# Patient Record
Sex: MALE | Race: WHITE | NOT HISPANIC OR LATINO | Employment: OTHER | URBAN - METROPOLITAN AREA
[De-identification: names, ages, dates, MRNs, and addresses within clinical notes are randomized per-mention and may not be internally consistent; named-entity substitution may affect disease eponyms.]

---

## 2018-09-10 ENCOUNTER — HOSPITAL ENCOUNTER (EMERGENCY)
Facility: MEDICAL CENTER | Age: 73
End: 2018-09-10
Attending: EMERGENCY MEDICINE
Payer: COMMERCIAL

## 2018-09-10 ENCOUNTER — APPOINTMENT (OUTPATIENT)
Dept: RADIOLOGY | Facility: MEDICAL CENTER | Age: 73
End: 2018-09-10
Attending: EMERGENCY MEDICINE
Payer: COMMERCIAL

## 2018-09-10 VITALS
RESPIRATION RATE: 17 BRPM | OXYGEN SATURATION: 99 % | DIASTOLIC BLOOD PRESSURE: 92 MMHG | WEIGHT: 158.73 LBS | TEMPERATURE: 98 F | HEART RATE: 105 BPM | SYSTOLIC BLOOD PRESSURE: 150 MMHG

## 2018-09-10 DIAGNOSIS — R47.01 APHASIA: ICD-10-CM

## 2018-09-10 LAB
ALBUMIN SERPL BCP-MCNC: 4.1 G/DL (ref 3.2–4.9)
ALBUMIN/GLOB SERPL: 1.5 G/DL
ALP SERPL-CCNC: 72 U/L (ref 30–99)
ALT SERPL-CCNC: 17 U/L (ref 2–50)
ANION GAP SERPL CALC-SCNC: 14 MMOL/L (ref 0–11.9)
APPEARANCE UR: CLEAR
APTT PPP: 24.7 SEC (ref 24.7–36)
AST SERPL-CCNC: 26 U/L (ref 12–45)
BASOPHILS # BLD AUTO: 1 % (ref 0–1.8)
BASOPHILS # BLD: 0.05 K/UL (ref 0–0.12)
BILIRUB SERPL-MCNC: 0.3 MG/DL (ref 0.1–1.5)
BILIRUB UR QL STRIP.AUTO: NEGATIVE
BUN SERPL-MCNC: 16 MG/DL (ref 8–22)
CALCIUM SERPL-MCNC: 8.6 MG/DL (ref 8.5–10.5)
CHLORIDE SERPL-SCNC: 105 MMOL/L (ref 96–112)
CO2 SERPL-SCNC: 21 MMOL/L (ref 20–33)
COLOR UR: YELLOW
CREAT SERPL-MCNC: 1.05 MG/DL (ref 0.5–1.4)
EKG IMPRESSION: NORMAL
EOSINOPHIL # BLD AUTO: 0.09 K/UL (ref 0–0.51)
EOSINOPHIL NFR BLD: 1.8 % (ref 0–6.9)
ERYTHROCYTE [DISTWIDTH] IN BLOOD BY AUTOMATED COUNT: 46.1 FL (ref 35.9–50)
GLOBULIN SER CALC-MCNC: 2.7 G/DL (ref 1.9–3.5)
GLUCOSE SERPL-MCNC: 185 MG/DL (ref 65–99)
GLUCOSE UR STRIP.AUTO-MCNC: NEGATIVE MG/DL
HCT VFR BLD AUTO: 45.4 % (ref 42–52)
HGB BLD-MCNC: 14.3 G/DL (ref 14–18)
IMM GRANULOCYTES # BLD AUTO: 0.01 K/UL (ref 0–0.11)
IMM GRANULOCYTES NFR BLD AUTO: 0.2 % (ref 0–0.9)
INR PPP: 0.96 (ref 0.87–1.13)
KETONES UR STRIP.AUTO-MCNC: NEGATIVE MG/DL
LEUKOCYTE ESTERASE UR QL STRIP.AUTO: NEGATIVE
LYMPHOCYTES # BLD AUTO: 1.94 K/UL (ref 1–4.8)
LYMPHOCYTES NFR BLD: 37.8 % (ref 22–41)
MCH RBC QN AUTO: 29.6 PG (ref 27–33)
MCHC RBC AUTO-ENTMCNC: 31.5 G/DL (ref 33.7–35.3)
MCV RBC AUTO: 94 FL (ref 81.4–97.8)
MICRO URNS: NORMAL
MONOCYTES # BLD AUTO: 0.46 K/UL (ref 0–0.85)
MONOCYTES NFR BLD AUTO: 9 % (ref 0–13.4)
NEUTROPHILS # BLD AUTO: 2.58 K/UL (ref 1.82–7.42)
NEUTROPHILS NFR BLD: 50.2 % (ref 44–72)
NITRITE UR QL STRIP.AUTO: NEGATIVE
NRBC # BLD AUTO: 0 K/UL
NRBC BLD-RTO: 0 /100 WBC
PH UR STRIP.AUTO: 5 [PH]
PLATELET # BLD AUTO: 204 K/UL (ref 164–446)
PMV BLD AUTO: 10.5 FL (ref 9–12.9)
POTASSIUM SERPL-SCNC: 3.6 MMOL/L (ref 3.6–5.5)
PROT SERPL-MCNC: 6.8 G/DL (ref 6–8.2)
PROT UR QL STRIP: NEGATIVE MG/DL
PROTHROMBIN TIME: 12.5 SEC (ref 12–14.6)
RBC # BLD AUTO: 4.83 M/UL (ref 4.7–6.1)
RBC UR QL AUTO: NEGATIVE
SODIUM SERPL-SCNC: 140 MMOL/L (ref 135–145)
SP GR UR STRIP.AUTO: 1.01
UROBILINOGEN UR STRIP.AUTO-MCNC: 0.2 MG/DL
WBC # BLD AUTO: 5.1 K/UL (ref 4.8–10.8)

## 2018-09-10 PROCEDURE — 99284 EMERGENCY DEPT VISIT MOD MDM: CPT

## 2018-09-10 PROCEDURE — 93005 ELECTROCARDIOGRAM TRACING: CPT | Performed by: EMERGENCY MEDICINE

## 2018-09-10 PROCEDURE — 85610 PROTHROMBIN TIME: CPT

## 2018-09-10 PROCEDURE — 70450 CT HEAD/BRAIN W/O DYE: CPT

## 2018-09-10 PROCEDURE — 85025 COMPLETE CBC W/AUTO DIFF WBC: CPT

## 2018-09-10 PROCEDURE — 80053 COMPREHEN METABOLIC PANEL: CPT

## 2018-09-10 PROCEDURE — 81003 URINALYSIS AUTO W/O SCOPE: CPT

## 2018-09-10 PROCEDURE — 71045 X-RAY EXAM CHEST 1 VIEW: CPT

## 2018-09-10 PROCEDURE — 85730 THROMBOPLASTIN TIME PARTIAL: CPT

## 2018-09-10 RX ORDER — ASPIRIN 325 MG
325 TABLET ORAL ONCE
Status: DISCONTINUED | OUTPATIENT
Start: 2018-09-10 | End: 2018-09-10 | Stop reason: HOSPADM

## 2018-09-10 ASSESSMENT — PAIN SCALES - GENERAL: PAINLEVEL_OUTOF10: 0

## 2018-09-10 NOTE — ED TRIAGE NOTES
Esteban Amezcua  73 y.o. male  Chief Complaint   Patient presents with   • ALOC     Acute episode of expressive aphasia at 2349 tongiht, A/Ox1 on scene, no other neuro deficits.  Pt now A/O to baseline.  Denies hx of CVA       Bib ems for above.  [facility] em[facility].  [facility] staff called ems.  Speech pattern was inappropriate and patient couldn't answer questions appropriately.  Pt improved to A/Ox3 in the ambulance, and now speaking full sentences.  Pt has no memory of this event, states that this has never happened before.  FSBS on scene was 141, all other vitals stable.  Denies drinking alcohol

## 2018-09-10 NOTE — ED PROVIDER NOTES
ED Provider Note    CHIEF COMPLAINT  Chief Complaint   Patient presents with   • ALOC     Acute episode of expressive aphasia at 2349 tongiht, A/Ox1 on scene, no other neuro deficits.  Pt now A/O to baseline.  Denies hx of CVA       HPI  Esteban Amezcua is a 73 y.o. male who presents after the patient had a spell of aphasia that started approximately 2349 this evening.  The [place] when he had a spell where he is having difficulty with speech and was speaking incomprehensibly.  The patient does not remember the event.  This was all per EMS.  The patient states he does not have a headache.  He has not had any associated palpitations, chest pain, no difficulty with breathing.  He has not noticed any weakness throughout his extremities nor paresthesias.  The patient is back to his baseline at this time with no current medical complaints.  He denies alcohol and drug use.    REVIEW OF SYSTEMS  See HPI for further details. All other systems are negative.     PAST MEDICAL HISTORY  History reviewed. No pertinent past medical history.    SOCIAL HISTORY  Social History     Social History   • Marital status: N/A     Spouse name: N/A   • Number of children: N/A   • Years of education: N/A     Social History Main Topics   • Smoking status: Former Smoker   • Smokeless tobacco: Never Used   • Alcohol use No   • Drug use: No   • Sexual activity: Not on file     Other Topics Concern   • Not on file     Social History Narrative   • No narrative on file           PHYSICAL EXAM  VITAL SIGNS: /92   Pulse (!) 110   Temp 36.7 °C (98 °F)   Resp 18   Wt 72 kg (158 lb 11.7 oz)   SpO2 98%   Constitutional: Well developed, Well nourished, No acute distress, Non-toxic appearance.   HENT: Normocephalic, Atraumatic, tympanic membranes are intact and nonerythematous bilaterally, Oropharynx moist without exudates or erythema, Nose normal.   Eyes: PERRLA, EOMI, Conjunctiva normal.  Neck: Supple without  meningismus  Lymphatic: No lymphadenopathy noted.   Cardiovascular: Normal heart rate, Normal rhythm, No murmurs, No rubs, No gallops.   Thorax & Lungs: Normal breath sounds, No respiratory distress, No wheezing, No chest tenderness.   Abdomen: Bowel sounds normal, Soft, No tenderness, no rebound, no guarding, no distention, No masses, No pulsatile masses.   Skin: Warm, Dry, No erythema, No rash.   Back: No tenderness, No CVA tenderness.   Extremities: Atraumatic with symmetric distal pulses, No edema, No tenderness, No cyanosis, No clubbing.   Neurologic: Alert & oriented x 3, cranial nerves II through XII are intact, Normal motor function, Normal sensory function, No focal deficits noted.   Psychiatric: Affect normal, Judgment normal, Mood normal.     EKG  Twelve-lead EKG interpreted by myself shows a sinus tachycardia with a ventricular rate of 105, QRS is left axis deviation, no ST segment elevation the meets criteria, he does have slight elevation in V2 but it does not meet criteria as it is approximately one half a box and amplitude, normal T waves.    RADIOLOGY/PROCEDURES  CT-HEAD W/O   Final Result         1.  No acute intracranial abnormality is identified, there are nonspecific white matter changes, commonly associated with small vessel ischemic disease.  Associated mild cerebral atrophy is noted.   2.  Atherosclerosis.      DX-CHEST-PORTABLE (1 VIEW)   Final Result         1.  No acute cardiopulmonary disease.            COURSE & MEDICAL DECISION MAKING  Pertinent Labs & Imaging studies reviewed. (See chart for details)  This a 73-year-old gentleman who presents the emergency department after a spell of expressive aphasia as well as confusion.  This is very concerning for my TIA standpoint.  CT scan of the head does show small vessel disease but otherwise no acute changes.  The blood work does not show any evidence of a metabolic source and clinically do not appreciate any evidence of an infection.  The  patient has been neurologically intact throughout his stay in the emergency department.  I spoke with the patient regarding admission he is adamant he wants to leave AGAINST MEDICAL ADVICE.  He understands the risk of this choice.  The patient is alert and appropriate and able to make this decision.  I spoke with friends regarding the patient's decision and he is aware that he can change his mind at any time and return for further workup.  Otherwise he will go to the [facility] for further outpatient management as soon as possible.  FINAL IMPRESSION  1.  Expressive aphasia with altered mental status  2.  Suspect TIA     Disposition  The patient will be discharged AGAINST MEDICAL ADVICE    Electronically signed by: Kulwant Horn, 9/10/2018 12:39 AM

## 2019-02-11 ENCOUNTER — HOSPITAL ENCOUNTER (EMERGENCY)
Dept: HOSPITAL 8 - ED | Age: 74
Discharge: HOME | End: 2019-02-11
Payer: OTHER GOVERNMENT

## 2019-02-11 VITALS — SYSTOLIC BLOOD PRESSURE: 143 MMHG | DIASTOLIC BLOOD PRESSURE: 87 MMHG

## 2019-02-11 VITALS — WEIGHT: 170.35 LBS | BODY MASS INDEX: 23.85 KG/M2 | HEIGHT: 71 IN

## 2019-02-11 DIAGNOSIS — R55: Primary | ICD-10-CM

## 2019-02-11 DIAGNOSIS — R45.851: ICD-10-CM

## 2019-02-11 DIAGNOSIS — Z85.028: ICD-10-CM

## 2019-02-11 LAB
ALBUMIN SERPL-MCNC: 4 G/DL (ref 3.4–5)
ALP SERPL-CCNC: 73 U/L (ref 45–117)
ALT SERPL-CCNC: 24 U/L (ref 12–78)
ANION GAP SERPL CALC-SCNC: 9 MMOL/L (ref 5–15)
BASOPHILS # BLD AUTO: 0.02 X10^3/UL (ref 0–0.1)
BASOPHILS NFR BLD AUTO: 0 % (ref 0–1)
BILIRUB SERPL-MCNC: 0.5 MG/DL (ref 0.2–1)
CALCIUM SERPL-MCNC: 8.4 MG/DL (ref 8.5–10.1)
CHLORIDE SERPL-SCNC: 105 MMOL/L (ref 98–107)
CREAT SERPL-MCNC: 1.28 MG/DL (ref 0.7–1.3)
CULTURE INDICATED?: NO
EOSINOPHIL # BLD AUTO: 0.04 X10^3/UL (ref 0–0.4)
EOSINOPHIL NFR BLD AUTO: 1 % (ref 1–7)
ERYTHROCYTE [DISTWIDTH] IN BLOOD BY AUTOMATED COUNT: 14.4 % (ref 9.4–14.8)
LYMPHOCYTES # BLD AUTO: 0.8 X10^3/UL (ref 1–3.4)
LYMPHOCYTES NFR BLD AUTO: 11 % (ref 22–44)
MCH RBC QN AUTO: 30.6 PG (ref 27.5–34.5)
MCHC RBC AUTO-ENTMCNC: 33.9 G/DL (ref 33.2–36.2)
MCV RBC AUTO: 90.5 FL (ref 81–97)
MD: NO
MICROSCOPIC: (no result)
MONOCYTES # BLD AUTO: 0.43 X10^3/UL (ref 0.2–0.8)
MONOCYTES NFR BLD AUTO: 6 % (ref 2–9)
NEUTROPHILS # BLD AUTO: 6.24 X10^3/UL (ref 1.8–6.8)
NEUTROPHILS NFR BLD AUTO: 83 % (ref 42–75)
PLATELET # BLD AUTO: 231 X10^3/UL (ref 130–400)
PMV BLD AUTO: 8.1 FL (ref 7.4–10.4)
PROT SERPL-MCNC: 7.3 G/DL (ref 6.4–8.2)
RBC # BLD AUTO: 4.79 X10^6/UL (ref 4.38–5.82)
TROPONIN I SERPL-MCNC: < 0.015 NG/ML (ref 0–0.04)

## 2019-02-11 PROCEDURE — 93005 ELECTROCARDIOGRAM TRACING: CPT

## 2019-02-11 PROCEDURE — 99284 EMERGENCY DEPT VISIT MOD MDM: CPT

## 2019-02-11 PROCEDURE — 84484 ASSAY OF TROPONIN QUANT: CPT

## 2019-02-11 PROCEDURE — 82140 ASSAY OF AMMONIA: CPT

## 2019-02-11 PROCEDURE — 81003 URINALYSIS AUTO W/O SCOPE: CPT

## 2019-02-11 PROCEDURE — 70450 CT HEAD/BRAIN W/O DYE: CPT

## 2019-02-11 PROCEDURE — 80053 COMPREHEN METABOLIC PANEL: CPT

## 2019-02-11 PROCEDURE — 85025 COMPLETE CBC W/AUTO DIFF WBC: CPT

## 2019-02-11 PROCEDURE — 71045 X-RAY EXAM CHEST 1 VIEW: CPT

## 2019-02-11 PROCEDURE — 36415 COLL VENOUS BLD VENIPUNCTURE: CPT

## 2019-02-11 PROCEDURE — 80307 DRUG TEST PRSMV CHEM ANLYZR: CPT

## 2019-02-11 NOTE — NUR
BIB EMS FROM [facility], PT WAS SITTING ON STOOL AT THE SLOT MACHINE, +SYNCOPAL 
FALLING BACKWORDS, NO APPARENT INJURY NOTED.  [facility] SECURITY DESCRIBED SEIZURE 
LIKE ACTIVITY TO EMS.  FS = 133, VSS.  PT INITIALY ALTERED, DISORGANIZED 
CONVERSATION.  NOW PRESENTS A & 0 X 4.  PT ON MONITOR, VSS.  EKG COMPLETED.  
CALL LIGHT W/I REACH

## 2019-02-11 NOTE — NUR
[facility], CONCERNS EXPRESSED TO 
NP AND CHARGE. EXPLAINED TO PT THAT WE WOULD LIKE TO MAKE SURE HES STABLE 
BEFORE LETTING HIM GO TO A DIFFERENT FACILITY. POOR HISTORIAN. DOESNT KNOW WHAT 
MEDS TAKING FOR SEIZURES. TOLD THIS RN THE MEDS ARE IN HIS CAR BUT TOLD NP THAT 
HE ISNT TAKING MEDS. WILL CONTINUE TO MONITOR.

## 2019-02-11 NOTE — NUR
REPORT RECEIVED FROM ROSEMARY AUSTIN, ASSUMING CARE OF PT. DASHAWN NP AT BEDSIDE FOR 
ASSESSMENT. AWAITING ORDERS AT THIS TIME

## 2019-09-30 ENCOUNTER — HOSPITAL ENCOUNTER (EMERGENCY)
Facility: MEDICAL CENTER | Age: 74
End: 2019-09-30
Attending: EMERGENCY MEDICINE
Payer: COMMERCIAL

## 2019-09-30 VITALS
HEART RATE: 109 BPM | RESPIRATION RATE: 18 BRPM | TEMPERATURE: 99.5 F | OXYGEN SATURATION: 95 % | SYSTOLIC BLOOD PRESSURE: 141 MMHG | WEIGHT: 170 LBS | HEIGHT: 71 IN | BODY MASS INDEX: 23.8 KG/M2 | DIASTOLIC BLOOD PRESSURE: 102 MMHG

## 2019-09-30 DIAGNOSIS — R40.4 UNRESPONSIVE EPISODE: ICD-10-CM

## 2019-09-30 PROCEDURE — 99283 EMERGENCY DEPT VISIT LOW MDM: CPT

## 2019-09-30 NOTE — ED NOTES
Pt refusing all treatment and would like to go. proivder explained to pt all risks of leaving without treatment. Pt signed AMA form. Pt left ambulatory with steady gait.

## 2019-09-30 NOTE — ED PROVIDER NOTES
ED Provider Note    CHIEF COMPLAINT  Chief Complaint   Patient presents with   • ALOC        HPI    Primary care provider: No primary care provider on file.   History obtained from: Patient and EMS  History limited by: None     Esteban Amezcua is a 74 y.o. male who presents to the ED by EMS after he was found on the ground unresponsive at [facility] by [employer] security shortly prior to arrival.  Per EMS, AED was applied and no shock was advised.  Chest compression was started and patient quickly became responsive but was only oriented to self.  By ED arrival, patient is oriented x3.  Patient states that he remembers going to the [employer] and was watching TV and the next thing he remembered he was in the ambulance.  However, he currently reports feeling fine and denies any symptoms including pain/shortness of breath or difficulty breathing/nausea/vomiting/weakness or sensory change.  He denies any recent fever/illness or cough.  He is refusing any testing or treatment.  Patient states that this has happened in the past but he does not want anything done and is requesting discharge.  He is also upset that [facility].  Patient denies any alcohol use.    REVIEW OF SYSTEMS  Please see HPI for pertinent positives/negatives.  All other systems reviewed and are negative.     PAST MEDICAL HISTORY  No past medical history on file.     SURGICAL HISTORY  Past Surgical History:   Procedure Laterality Date   • SKULL FRACTURE DEPRESSED ELEVATION          SOCIAL HISTORY  Social History     Tobacco Use   • Smoking status: Former Smoker   • Smokeless tobacco: Never Used   Substance and Sexual Activity   • Alcohol use: No   • Drug use: No   • Sexual activity: Not on file        FAMILY HISTORY  No family history on file.     CURRENT MEDICATIONS  Home Medications    **Home medications have not yet been reviewed for this encounter**          ALLERGIES  No Known Allergies     PHYSICAL EXAM  VITAL SIGNS: /102    "Pulse (!) 109   Temp 37.5 °C (99.5 °F) (Temporal)   Resp 18   Ht 1.803 m (5' 11\")   Wt 77.1 kg (170 lb)   SpO2 95%   BMI 23.71 kg/m²  @SAVANNA[[id number]::@     Pulse ox interpretation: 95% I interpret this pulse ox as normal     Cardiac monitor interpretation: Sinus rhythm with heart rate in the 100 as interpreted by me.  The patient presented with unresponsiveness and cardiac monitor was ordered to monitor for dysrhythmia.    Constitutional: Well developed, well nourished, alert in no apparent distress, nontoxic appearance    HENT: No external signs of trauma, normocephalic, oropharynx moist and clear, nose normal    Eyes: PERRL, EOMI, vision and visual fields are grossly intact bilaterally, conjunctiva without erythema, no discharge, no icterus    Neck: Soft and supple, trachea midline, no stridor, no tenderness, no LAD, no JVD, good ROM    Cardiovascular: Tachycardia, no murmurs/rubs/gallops, strong distal pulses and good perfusion    Thorax & Lungs: No respiratory distress, CTAB   Abdomen: Soft, nontender, nondistended, no guarding, no rebound, normal BS    Back: No CVAT    Extremities: No cyanosis, no edema, no gross deformity, good ROM, no tenderness, intact distal pulses with brisk cap refill    Skin: Warm, dry, no pallor/cyanosis, no rash noted    Lymphatic: No lymphadenopathy noted    Neuro: Alert and oriented to person, place, and time.  GCS 15.  CN II-XII grossly intact.  Normal speech.  Equal strength bilateral UE/LE.  Sensation intact to touch.  No cerebellar signs.  Psychiatric: Cooperative, slightly upset, normal judgement, no signs of active hallucinations or delusions      DIAGNOSTIC STUDIES / PROCEDURES        LABS  All labs reviewed by me.     Results for orders placed or performed during the hospital encounter of 09/10/18   CBC WITH DIFFERENTIAL   Result Value Ref Range    WBC 5.1 4.8 - 10.8 K/uL    RBC 4.83 4.70 - 6.10 M/uL    Hemoglobin 14.3 14.0 - 18.0 g/dL    Hematocrit 45.4 42.0 - 52.0 %    " MCV 94.0 81.4 - 97.8 fL    MCH 29.6 27.0 - 33.0 pg    MCHC 31.5 (L) 33.7 - 35.3 g/dL    RDW 46.1 35.9 - 50.0 fL    Platelet Count 204 164 - 446 K/uL    MPV 10.5 9.0 - 12.9 fL    Neutrophils-Polys 50.20 44.00 - 72.00 %    Lymphocytes 37.80 22.00 - 41.00 %    Monocytes 9.00 0.00 - 13.40 %    Eosinophils 1.80 0.00 - 6.90 %    Basophils 1.00 0.00 - 1.80 %    Immature Granulocytes 0.20 0.00 - 0.90 %    Nucleated RBC 0.00 /100 WBC    Neutrophils (Absolute) 2.58 1.82 - 7.42 K/uL    Lymphs (Absolute) 1.94 1.00 - 4.80 K/uL    Monos (Absolute) 0.46 0.00 - 0.85 K/uL    Eos (Absolute) 0.09 0.00 - 0.51 K/uL    Baso (Absolute) 0.05 0.00 - 0.12 K/uL    Immature Granulocytes (abs) 0.01 0.00 - 0.11 K/uL    NRBC (Absolute) 0.00 K/uL   CMP   Result Value Ref Range    Sodium 140 135 - 145 mmol/L    Potassium 3.6 3.6 - 5.5 mmol/L    Chloride 105 96 - 112 mmol/L    Co2 21 20 - 33 mmol/L    Anion Gap 14.0 (H) 0.0 - 11.9    Glucose 185 (H) 65 - 99 mg/dL    Bun 16 8 - 22 mg/dL    Creatinine 1.05 0.50 - 1.40 mg/dL    Calcium 8.6 8.5 - 10.5 mg/dL    AST(SGOT) 26 12 - 45 U/L    ALT(SGPT) 17 2 - 50 U/L    Alkaline Phosphatase 72 30 - 99 U/L    Total Bilirubin 0.3 0.1 - 1.5 mg/dL    Albumin 4.1 3.2 - 4.9 g/dL    Total Protein 6.8 6.0 - 8.2 g/dL    Globulin 2.7 1.9 - 3.5 g/dL    A-G Ratio 1.5 g/dL   PROTHROMBIN TIME   Result Value Ref Range    PT 12.5 12.0 - 14.6 sec    INR 0.96 0.87 - 1.13   APTT   Result Value Ref Range    APTT 24.7 24.7 - 36.0 sec   URINALYSIS CULTURE, IF INDICATED   Result Value Ref Range    Color Yellow     Character Clear     Specific Gravity 1.013 <1.035    Ph 5.0 5.0 - 8.0    Glucose Negative Negative mg/dL    Ketones Negative Negative mg/dL    Protein Negative Negative mg/dL    Bilirubin Negative Negative    Urobilinogen, Urine 0.2 Negative    Nitrite Negative Negative    Leukocyte Esterase Negative Negative    Occult Blood Negative Negative    Micro Urine Req see below    ESTIMATED GFR   Result Value Ref Range    GFR If  African American >60 >60 mL/min/1.73 m 2    GFR If Non African American >60 >60 mL/min/1.73 m 2   EKG (NOW)   Result Value Ref Range    Report       [facility] Emergency Dept.    Test Date:  2018-09-10  Pt Name:    NITIN BRITT                Department: ER  MRN:        [id number]                      Room:        14  Gender:     Male                         Technician: [id number]  :        1945                   Requested By:GIANNA LAI  Order #:    [id number]                    Reading MD:    Measurements  Intervals                                Axis  Rate:       105                          P:          17  IN:         172                          QRS:        -31  QRSD:       92                           T:          40  QT:         344  QTc:        455    Interpretive Statements  SINUS TACHYCARDIA  LEFT AXIS DEVIATION  RSR' IN V1 OR V2, PROBABLY NORMAL VARIANT  CONSIDER ANTERIOR INFARCT  No previous ECG available for comparison          RADIOLOGY  The radiologist's interpretation of all radiological studies have been reviewed by me.     No orders to display          COURSE & MEDICAL DECISION MAKING  Nursing notes, VS, PMSFHx reviewed in chart.     Review of past medical records shows the patient was last seen in this ED September 10, 2018 for altered level of consciousness and patient signed out AMA.      Differential diagnoses considered include but are not limited to: CVA/TIA/intracranial hemorrhage, syncope, Sz, OD/intoxication, hypoglycemia, hypoxia, hypercarbia, electrolyte abnormality      History and physical exam as above.  By ED arrival, patient was alert and oriented and refused any testing or treatment.  He is not clinically intoxicated and denies ever using alcohol and is exhibiting capacity to make his own medical decision and understands the risks of signing out AMA.  He was encouraged to return to the ED if any concerns or if he changes his mind and he was [facility]  Patient verbalized understanding with no further questions or concerns.       The patient is leaving against medical advice.  I discussed with the patient the risks of leaving without receiving appropriate care to include permanent disability or death.  I have discussed possible alternatives.  The patient is not intoxicated clinically and has the capacity to understand the risks of his decision.  While I disagree with the patient's decision, I respect the patient's autonomy and will not keep him here against his will.  The patient was given discharge instructions and a followup plan as documented in the medical record.  I have advised the patient that he can return to the ED at any time.        The patient is referred to a primary physician for blood pressure management, diabetic screening, and for all other preventative health concerns.       FINAL IMPRESSION  1. Unresponsive episode Temporary          DISPOSITION  Patient signed out AMA      FOLLOW UP  Please [facility]    Call [facility], Emergency Dept  [address] [id number] [phone]    If symptoms worsen         OUTPATIENT MEDICATIONS  There are no discharge medications for this patient.         Electronically signed by: Андрей Hidalgo, 9/30/2019 12:47 AM      Portions of this record were made with voice recognition software.  Despite my review, spelling/grammar/context errors may still remain.  Interpretation of this chart should be taken in this context.

## 2019-09-30 NOTE — ED TRIAGE NOTES
Pt found down by security at [facility], AED was placed, no shock advised, compressions were started. Shortly into compressions pt came to. EMS arrived pt was alert to self. On arrival to ER pt is alert, oriented x3. neuros intact. BS by . 20G PIV in place by EMS. Pt placed on monitor. Awaiting provider eval.

## 2021-01-15 DIAGNOSIS — Z23 NEED FOR VACCINATION: ICD-10-CM

## 2021-06-12 ENCOUNTER — HOSPITAL ENCOUNTER (EMERGENCY)
Facility: MEDICAL CENTER | Age: 76
End: 2021-06-12
Attending: EMERGENCY MEDICINE
Payer: COMMERCIAL

## 2021-06-12 VITALS
HEIGHT: 69 IN | BODY MASS INDEX: 25.18 KG/M2 | HEART RATE: 100 BPM | DIASTOLIC BLOOD PRESSURE: 83 MMHG | WEIGHT: 170 LBS | TEMPERATURE: 98.1 F | SYSTOLIC BLOOD PRESSURE: 142 MMHG | OXYGEN SATURATION: 95 %

## 2021-06-12 DIAGNOSIS — R56.9 SEIZURE (HCC): ICD-10-CM

## 2021-06-12 LAB
ALBUMIN SERPL BCP-MCNC: 3.6 G/DL (ref 3.2–4.9)
ALBUMIN/GLOB SERPL: 1.5 G/DL
ALP SERPL-CCNC: 60 U/L (ref 30–99)
ALT SERPL-CCNC: 20 U/L (ref 2–50)
ANION GAP SERPL CALC-SCNC: 17 MMOL/L (ref 7–16)
AST SERPL-CCNC: 28 U/L (ref 12–45)
BASOPHILS # BLD AUTO: 0.7 % (ref 0–1.8)
BASOPHILS # BLD: 0.03 K/UL (ref 0–0.12)
BILIRUB SERPL-MCNC: 0.3 MG/DL (ref 0.1–1.5)
BUN SERPL-MCNC: 14 MG/DL (ref 8–22)
CALCIUM SERPL-MCNC: 8.1 MG/DL (ref 8.5–10.5)
CHLORIDE SERPL-SCNC: 106 MMOL/L (ref 96–112)
CO2 SERPL-SCNC: 19 MMOL/L (ref 20–33)
CREAT SERPL-MCNC: 1.08 MG/DL (ref 0.5–1.4)
EOSINOPHIL # BLD AUTO: 0.01 K/UL (ref 0–0.51)
EOSINOPHIL NFR BLD: 0.2 % (ref 0–6.9)
ERYTHROCYTE [DISTWIDTH] IN BLOOD BY AUTOMATED COUNT: 50.3 FL (ref 35.9–50)
GLOBULIN SER CALC-MCNC: 2.4 G/DL (ref 1.9–3.5)
GLUCOSE SERPL-MCNC: 241 MG/DL (ref 65–99)
HCT VFR BLD AUTO: 38.8 % (ref 42–52)
HGB BLD-MCNC: 12.7 G/DL (ref 14–18)
IMM GRANULOCYTES # BLD AUTO: 0.02 K/UL (ref 0–0.11)
IMM GRANULOCYTES NFR BLD AUTO: 0.4 % (ref 0–0.9)
LYMPHOCYTES # BLD AUTO: 1.21 K/UL (ref 1–4.8)
LYMPHOCYTES NFR BLD: 26.8 % (ref 22–41)
MCH RBC QN AUTO: 30.9 PG (ref 27–33)
MCHC RBC AUTO-ENTMCNC: 32.7 G/DL (ref 33.7–35.3)
MCV RBC AUTO: 94.4 FL (ref 81.4–97.8)
MONOCYTES # BLD AUTO: 0.35 K/UL (ref 0–0.85)
MONOCYTES NFR BLD AUTO: 7.7 % (ref 0–13.4)
NEUTROPHILS # BLD AUTO: 2.9 K/UL (ref 1.82–7.42)
NEUTROPHILS NFR BLD: 64.2 % (ref 44–72)
NRBC # BLD AUTO: 0 K/UL
NRBC BLD-RTO: 0 /100 WBC
PLATELET # BLD AUTO: 177 K/UL (ref 164–446)
PMV BLD AUTO: 10.8 FL (ref 9–12.9)
POTASSIUM SERPL-SCNC: 3.5 MMOL/L (ref 3.6–5.5)
PROT SERPL-MCNC: 6 G/DL (ref 6–8.2)
RBC # BLD AUTO: 4.11 M/UL (ref 4.7–6.1)
SODIUM SERPL-SCNC: 142 MMOL/L (ref 135–145)
WBC # BLD AUTO: 4.5 K/UL (ref 4.8–10.8)

## 2021-06-12 PROCEDURE — 99283 EMERGENCY DEPT VISIT LOW MDM: CPT

## 2021-06-12 PROCEDURE — 85025 COMPLETE CBC W/AUTO DIFF WBC: CPT

## 2021-06-12 PROCEDURE — 80053 COMPREHEN METABOLIC PANEL: CPT

## 2021-06-12 PROCEDURE — 36415 COLL VENOUS BLD VENIPUNCTURE: CPT

## 2021-06-12 ASSESSMENT — LIFESTYLE VARIABLES
DOES PATIENT WANT TO STOP DRINKING: NO
DO YOU DRINK ALCOHOL: NO

## 2021-06-12 NOTE — ED PROVIDER NOTES
ED Provider Note    CHIEF COMPLAINT  Chief Complaint   Patient presents with   • Seizure     pt was found having sz like activity in front of his car today at the [employer]. hx of sz's.        HPI  Esteban Amezcua is a 76 y.o. male who presents after a seizure.  The patient does not remember the events today.  He states he has a longstanding seizure disorder has been compliant with his medication.  He is adamant that he like to go home and there is nothing wrong.  He states he has not had any vomiting or diarrhea.  He denies dysuria.  He has not had any recent fevers.  He states he has been compliant but cannot remember his medication.    REVIEW OF SYSTEMS  See HPI for further details. All other systems are negative.     PAST MEDICAL HISTORY  No past medical history on file.    FAMILY HISTORY  [unfilled]    SOCIAL HISTORY  Social History     Socioeconomic History   • Marital status: Single     Spouse name: Not on file   • Number of children: Not on file   • Years of education: Not on file   • Highest education level: Not on file   Occupational History   • Not on file   Tobacco Use   • Smoking status: Former Smoker   • Smokeless tobacco: Never Used   Substance and Sexual Activity   • Alcohol use: No   • Drug use: No   • Sexual activity: Not on file   Other Topics Concern   • Not on file   Social History Narrative   • Not on file     Social Determinants of Health     Financial Resource Strain:    • Difficulty of Paying Living Expenses:    Food Insecurity:    • Worried About Running Out of Food in the Last Year:    • Ran Out of Food in the Last Year:    Transportation Needs:    • Lack of Transportation (Medical):    • Lack of Transportation (Non-Medical):    Physical Activity:    • Days of Exercise per Week:    • Minutes of Exercise per Session:    Stress:    • Feeling of Stress :    Social Connections:    • Frequency of Communication with Friends and Family:    • Frequency of Social Gatherings with Friends and Family:    •  "Attends Jew Services:    • Active Member of Clubs or Organizations:    • Attends Club or Organization Meetings:    • Marital Status:    Intimate Partner Violence:    • Fear of Current or Ex-Partner:    • Emotionally Abused:    • Physically Abused:    • Sexually Abused:        SURGICAL HISTORY  Past Surgical History:   Procedure Laterality Date   • SKULL FRACTURE DEPRESSED ELEVATION         CURRENT MEDICATIONS  Home Medications    **Home medications have not yet been reviewed for this encounter**         ALLERGIES  No Known Allergies    PHYSICAL EXAM  VITAL SIGNS: /76   Pulse (!) 114   Temp 36.7 °C (98.1 °F) (Temporal)   Ht 1.753 m (5' 9\")   Wt 77.1 kg (170 lb)   SpO2 93%   BMI 25.10 kg/m²       Constitutional: Anxious but nontoxic.   HENT: Normocephalic, Atraumatic, Bilateral external ears normal, Oropharynx moist, No oral exudates, Nose normal.   Eyes: PERRLA, EOMI, Conjunctiva normal, No discharge.   Neck: Normal range of motion, No tenderness, Supple, No stridor.   Lymphatic: No lymphadenopathy noted.   Cardiovascular: Tachycardic heart rate, Normal rhythm, No murmurs, No rubs, No gallops.   Thorax & Lungs: Normal breath sounds, No respiratory distress, No wheezing, No chest tenderness.   Abdomen: Bowel sounds normal, Soft, No tenderness, No masses, No pulsatile masses.   Skin: Warm, Dry, No erythema, No rash.   Back: No tenderness, No CVA tenderness.   Extremities: Intact distal pulses, No edema, No tenderness, No cyanosis, No clubbing.   Neurologic: Alert & oriented x 3, Normal motor function, Normal sensory function, No focal deficits noted.   Psychiatric: Affect normal, Judgment normal, Mood normal.       COURSE & MEDICAL DECISION MAKING  Pertinent Labs & Imaging studies reviewed. (See chart for details)  This a 76-year-old gentleman who presents after a seizure.  The patient states he has a known seizure disorder has been compliant with his medication.  Metabolic panel does show some slight " acidosis most likely from the seizure and he had received a liter of fluid prior to my exam I ordered another liter of fluid.  The patient is asymptomatic on repeat exam.  Clinically do not appreciate any evidence of an infectious process that would decrease his seizure threshold.  He states he like to go home as he is comfortable taking care of his seizures.  There for the patient will be discharged in stable condition with clear instructions to follow-up with his neurologist.    FINAL IMPRESSION  1.  Seizure  2.  Mild dehydration    Disposition  The patient will be discharged in stable condition         Electronically signed by: Kulwant Horn M.D., 6/12/2021 3:35 PM

## 2021-12-15 ENCOUNTER — HOSPITAL ENCOUNTER (EMERGENCY)
Facility: MEDICAL CENTER | Age: 76
End: 2021-12-15
Payer: COMMERCIAL

## 2021-12-15 PROCEDURE — 302449 STATCHG TRIAGE ONLY (STATISTIC)

## 2023-02-11 ENCOUNTER — HOSPITAL ENCOUNTER (EMERGENCY)
Facility: MEDICAL CENTER | Age: 78
End: 2023-02-11
Attending: EMERGENCY MEDICINE
Payer: COMMERCIAL

## 2023-02-11 ENCOUNTER — APPOINTMENT (OUTPATIENT)
Dept: RADIOLOGY | Facility: MEDICAL CENTER | Age: 78
End: 2023-02-11
Attending: EMERGENCY MEDICINE
Payer: COMMERCIAL

## 2023-02-11 VITALS
RESPIRATION RATE: 18 BRPM | TEMPERATURE: 98 F | HEART RATE: 98 BPM | HEIGHT: 69 IN | SYSTOLIC BLOOD PRESSURE: 156 MMHG | DIASTOLIC BLOOD PRESSURE: 90 MMHG | OXYGEN SATURATION: 98 % | BODY MASS INDEX: 25.92 KG/M2 | WEIGHT: 175 LBS

## 2023-02-11 DIAGNOSIS — S09.90XA CLOSED HEAD INJURY, INITIAL ENCOUNTER: ICD-10-CM

## 2023-02-11 DIAGNOSIS — R56.9 SEIZURE (HCC): ICD-10-CM

## 2023-02-11 PROCEDURE — 70450 CT HEAD/BRAIN W/O DYE: CPT

## 2023-02-11 PROCEDURE — 99284 EMERGENCY DEPT VISIT MOD MDM: CPT | Mod: 25

## 2023-02-11 ASSESSMENT — FIBROSIS 4 INDEX: FIB4 SCORE: 2.65

## 2023-02-12 NOTE — ED PROVIDER NOTES
ED Provider Note    CHIEF COMPLAINT  Chief Complaint   Patient presents with    T-5000 Head Injury     Pt walking into [facility], tripped & fell, hitting R side of head & then had a witnessed seizure. Hx seizure disorder, running out of meds, lives in a hotel room at the [facility]. Post ictal with EMS, GCS 14 now. . No thinners. +R eyebrow abrasion.       EXTERNAL RECORDS REVIEWED  Outpatient Notes show that the patient has not been to this emergency department since December 2021    HPI/ROS  LIMITATION TO HISTORY   Select: : None  OUTSIDE HISTORIAN(S):  EMS who report that the patient fell forward onto his face while walking into the [facility] today.  He then had a tonic-clonic grand mal seizure.  The patient has a history of seizures.  He is on seizure medications but states he is about to run out.  The patient states that he does not really want to be here and wants to be discharged    Esteban Amezcua is a 77 y.o. male who presents complaining of a head injury after a mechanical fall when he tripped and fell into the [facility] hitting his face.  Then proceeded to have a grand mal seizure.  The patient was somewhat postictal but is improving in his mental status.  He did sustain a abrasion above his right eyebrow with hematoma.  The patient denies any headache, neck pain back pain or extremity pain or injury.  He denies any chest or abdominal pain.  He denies any nausea and has not vomited.  The patient states that he is on seizure medications that he takes 4 times a day but cannot remember the name of.  He states he is about to run out of these medications.  Patient states that he is homeless.  He is adamant that he does not need a tetanus shot.    PAST MEDICAL HISTORY       SURGICAL HISTORY   has a past surgical history that includes skull fracture depressed elevation.    FAMILY HISTORY  No family history on file.    SOCIAL HISTORY  Social History     Tobacco Use    Smoking status: Former    Smokeless tobacco:  "Never   Substance and Sexual Activity    Alcohol use: No    Drug use: No    Sexual activity: Not on file       CURRENT MEDICATIONS  Home Medications       Reviewed by Salma Orellana R.N. (Registered Nurse) on 02/11/23 at 1640  Med List Status: Not Addressed     Medication Last Dose Status        Patient [place] Taking any Medications                           ALLERGIES  No Known Allergies    PHYSICAL EXAM  VITAL SIGNS: BP (!) 156/90   Pulse 98   Temp 36.7 °C (98 °F) (Temporal)   Resp 18   Ht 1.753 m (5' 9\")   Wt 79.4 kg (175 lb)   SpO2 98%   BMI 25.84 kg/m²      General: Alert awake GCS 15 no acute distress  HEENT: Normocephalic sitive for a small cephalohematoma over the right eyebrow without abrasion, no active bleeding no loose teeth or malocclusion no felipe signs or raccoon eyes no septal hematoma  Neck:  No C-spine tenderness step-offs or crepitance trachea midline  Pulmonary: Lungs clear to auscultation bilaterally no wheezes rales or rhonchi no bony crepitance or subcutaneous emphysema no paradoxical chest wall movements no contusions  Cardiac: Regular rate and rhythm no murmurs rubs or gallops equal strong pulses in all extremities   Abdomen: Soft scaphoid nontender nondistended no rebound masses or peritoneal signs no seatbelt sign  Skin: No lacerations contusions positive right forehead abrasion no cyanosis  Extremities/Musculoskeletal: Hips and pelvis stable and nontender to palpation no obvious extremity deformity contusions or abrasions equal strength and sensation upper and lower extremities bilaterally  Back:  No T-spine or LS-spine tenderness step-offs or crepitance  Neuro: Alert and oriented x3 full range of motion of all extremities no focal deficits         DIAGNOSTIC STUDIES / PROCEDURES  EKG  I have independently interpreted this EKG  none    LABS  none    RADIOLOGY  I have independently interpreted the diagnostic imaging associated with this visit and am waiting the final reading from " the radiologist.   My preliminary interpretation is a follows: CT head no hemorrhage mass effect or skull fracture  Radiologist interpretation:   CT-HEAD W/O   Final Result      1. Right supraorbital contusion. No CT evidence of acute infarct, intracranial hemorrhage or mass.   2. Moderate global parenchymal atrophy. Chronic small vessel ischemic changes.   3. 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            COURSE & MEDICAL DECISION MAKING    ED Observation Status? No; Patient does not meet criteria for ED Observation.     INITIAL ASSESSMENT, COURSE AND PLAN  Care Narrative: Is a 77-year-old male with a history of seizure disorder who sustained a mechanical ground-level fall onto his face today followed by a grand mal seizure.  The patient was initially postictal but now is alert and awake and answering questions appropriately.  On exam he has no evidence of injury except for a cephalohematoma and abrasion above his right eyebrow.  CT of the head was obtained to further evaluate his symptoms.  We will try to get his med list and talk to  about refilling his meds and his living situation.        ADDITIONAL PROBLEM LIST  Seizure disorder  DISPOSITION AND DISCUSSIONS    I went in to give the patient his CT results and he was sitting up in bed and states he is ready to be discharged.  He states [facility] and [facility] for a [facility].  I [facility] for recheck on his medications to see if he can get them filled so he does not run out.  The patient understands and will be discharged in stable condition at this time.  He is to return for any severe headache vomiting vision changes or worsening symptoms.    I have discussed management of the patient with the following physicians and PRECIOUS's: None    Discussion of management with other QHP or appropriate source(s): Social  Work regarding his living situation and medications      Escalation of care considered, and ultimately not performed:none    Barriers to care at this time, including but not limited to: Patient is homeless and Patient lacks transportation .     Decision tools and prescription drugs considered including, but not limited to:  None none .      The patient will return for new or worsening symptoms and is stable at the time of discharge.    The patient is referred to a primary physician for blood pressure management, diabetic screening, and for all other preventative health concerns.      DISPOSITION:  Patient will be discharged home in stable condition.    FOLLOW UP:  [address] [id number] [phone]  In 1 day  for recheck      OUTPATIENT MEDICATIONS:  New Prescriptions    No medications on file       FINAL DIAGNOSIS  1. Closed head injury, initial encounter    2. Seizure (HCC)           Electronically signed by: Stephie Colon M.D., 2/11/2023 4:31 PM

## 2023-02-12 NOTE — ED TRIAGE NOTES
Esteban Amezcua  77 y.o.  male  Chief Complaint   Patient presents with    T-5000 Head Injury     Pt walking into [place], tripped & fell, hitting R side of head & then had a witnessed seizure. Hx seizure disorder, running out of meds, lives in a hotel room at the [facility]. Post ictal with EMS, GCS 14 now. . No thinners. +R eyebrow abrasion.     Pt to CT, then Red 2. Alert, oriented.

## 2023-02-12 NOTE — DISCHARGE PLANNING
Cab voucher provided to assist with discharge to [facility] per patient request ([id number]).

## 2023-02-12 NOTE — ED NOTES
Patient discharged home per ERP.  Discharge teaching and education discussed with patient. POC discussed.   Patient verbalized understanding of discharge teaching and education. No other questions at this time.     PIV removed.

## 2023-07-27 ENCOUNTER — HOSPITAL ENCOUNTER (EMERGENCY)
Facility: MEDICAL CENTER | Age: 78
End: 2023-07-27
Attending: EMERGENCY MEDICINE
Payer: COMMERCIAL

## 2023-07-27 VITALS
OXYGEN SATURATION: 97 % | TEMPERATURE: 98.4 F | DIASTOLIC BLOOD PRESSURE: 76 MMHG | HEIGHT: 69 IN | SYSTOLIC BLOOD PRESSURE: 131 MMHG | BODY MASS INDEX: 25.92 KG/M2 | HEART RATE: 107 BPM | WEIGHT: 175 LBS | RESPIRATION RATE: 14 BRPM

## 2023-07-27 DIAGNOSIS — R56.9 SEIZURE (HCC): ICD-10-CM

## 2023-07-27 PROCEDURE — 99284 EMERGENCY DEPT VISIT MOD MDM: CPT

## 2023-07-27 RX ORDER — LEVETIRACETAM 500 MG/5ML
1500 INJECTION, SOLUTION, CONCENTRATE INTRAVENOUS ONCE
Status: DISCONTINUED | OUTPATIENT
Start: 2023-07-27 | End: 2023-07-27

## 2023-07-27 NOTE — ED TRIAGE NOTES
"Chief Complaint   Patient presents with    Seizure     Per EMS pt is frequent flyer at the [employer] and is non-compliant with his seizure medication.  Pt has seizure for unknown time.     ALOC     Pt altered on arrival and restless. Pt does not remember event.      BP (!) 169/88   Pulse (!) 115   Resp 16   Ht 1.753 m (5' 9\")   Wt 79.4 kg (175 lb)   SpO2 97%   BMI 25.84 kg/m²       Pt placed on the monitor. Pt very confused on arrival. Pt's blood sugar 150 for EMS.   "

## 2023-07-28 NOTE — ED NOTES
Pt has discharge orders. Pt educated on discharge instructions.  [facility]. Pt verbalizes understanding. Pt ambulatory to lobby with personal cane. Pt going home with self.

## 2023-07-28 NOTE — DISCHARGE INSTRUCTIONS
You were seen in the Emergency Department for seizure.    You refused further workup here.    Please use 1,000mg of tylenol or 600mg of ibuprofen every 6 hours as needed for pain.    [facility] and continue seizure medications every day.      Return to the Emergency Department with  recurrent seizure, fever, confusion, severe headache, or other concerns.

## 2023-07-28 NOTE — ED PROVIDER NOTES
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  Pt has seizure for unknown time.     ALOC     Pt altered on arrival and restless. Pt does not remember event.      EXTERNAL RECORDS REVIEWED  Review of patient's past medical records show he was last seen in Feb. 2023 after head trauma followed by seizure. CT scan of head showed no acute findings at that time.     HPI/ROS  LIMITATION TO HISTORY   None  OUTSIDE HISTORIAN(S):  None    Esteban Amezcua is a 78 y.o. male with a history of seizures who presents to the Emergency Department for an episode of seizing onset prior to his arrival. The patient was at the [facility] when he started seizing, the time of the seizure is unknown. He was initially confused however now resolving.  No complaints at this time.  No headache, neck pain.  He denies any recent illness such as coughing or fever. He also denies drinking any alcohol.The patient reports that he has had seizures since 1966. He states that he is compliant with taking his seizure medications but cannot remember the name of it. He gets his prescription from the [facility] but does not remember the last time he was there. The patient reports that he lives in his car.    PAST MEDICAL HISTORY  History reviewed. No pertinent past medical history.     SURGICAL HISTORY  Past Surgical History:   Procedure Laterality Date    SKULL FRACTURE DEPRESSED ELEVATION          FAMILY HISTORY  No family history pertinent to this encounter.     SOCIAL HISTORY   reports that he has quit smoking. He has never used smokeless tobacco. He reports that he does not drink alcohol and does not use drugs.    CURRENT MEDICATIONS  No current outpatient medications     ALLERGIES  Patient has no known allergies.    PHYSICAL EXAM  BP (!) 169/88   Pulse (!) 115   Temp 36.4 °C (97.5 °F) (Temporal)   Resp 16   Ht 1.753 m (5'  "9\")   Wt 79.4 kg (175 lb)   SpO2 97%   BMI 25.84 kg/m²      Constitutional: Nontoxic appearing. Alert in no apparent distress.  HENT: Normocephalic, Atraumatic. Bilateral external ears normal. Nose normal.  Moist mucous membranes. Oropharynx clear.  Eyes: Pupils are equal and reactive. Conjunctiva normal.   Neck: Supple, full range of motion.  Heart: Regular rate and rhythm. No murmurs.    Lungs: No respiratory distress, normal work of breathing. Lungs clear to auscultation bilaterally.  Abdomen Soft, no distention. No tenderness to palpation.  Musculoskeletal: Atraumatic. No obvious deformities noted. No lower extremity edema.  Skin: Warm, Dry.  No erythema, No rash.   Neurologic: Alert and oriented x3. Moving all extremities spontaneously without focal deficits.  Psychiatric: Affect normal, Mood normal, Appears appropriate and not intoxicated.       COURSE & MEDICAL DECISION MAKING    5:04 PM - Patient seen and examined at bedside. Patient was given the option to get blood work done and restart medications but he states that he would prefer to be discharged. I have [facility]. Patient had the opportunity to ask any questions. The plan for discharge was discussed with them and he was told to return for any new or worsening symptoms. He was also informed of the plans for follow up. Patient is understanding and agreeable to the plan for discharge.     ED Observation Status? No; Patient does not meet criteria for ED Observation.     INITIAL ASSESSMENT, COURSE AND PLAN  Care Narrative: Elderly patient with history of seizure disorder on unknown medication presents following witnessed seizure.  Initially tachycardic and hypertensive on arrival as well as postictal however on my assessment, vital signs and confusion both improving.  He has no complaints at this time.  No evidence of trauma on exam.  No focal neurologic deficits to suggest stroke or intracranial pathology.  No " history of alcohol use.  Recommended further workup with blood work to assess for electrolyte abnormality however patient is refusing.  He tells me he has his seizure medications and does not want further treatment at this time.  I'm not sure I believe him however he is alert and oriented and competent to make decisions therefore will be discharged with instructions to [facility].      ADDITIONAL PROBLEM LIST  Problem #1: Seizure - with history, refusing further workup and treatment, discharge with continuation of antiepileptics and [facility] at [facility]      DISPOSITION AND DISCUSSIONS  Escalation of care considered, and ultimately not performed: after discussion with the patient / family, they have elected to decline an escalation in care    Barriers to care at this time, including but not limited to: Patient does not have established PCP and Patient is homeless.        The patient will return for new or worsening symptoms and is stable at the time of discharge.    The patient is referred to a primary physician for blood pressure management, diabetic screening, and for all other preventative health concerns.    DISPOSITION:  Patient will be discharged home in stable condition.    FOLLOW UP:  [facility] - Medical Services  [address] [place] [address]  [phone]  Schedule an appointment as soon as possible for a visit       [facility], Emergency Dept  [address] [place] [id number] [phone]    If symptoms worsen      FINAL DIAGNOSIS  1. Seizure (HCC)        The note accurately reflects work and decisions made by me.  Kim Vera M.D.  7/27/2023  9:55 PM    Alison CHRISTIANSON (Brayan), am scribing for, and in the presence of, Kim Vera M.D..    Electronically signed by: Alison Donald (Brayan), 7/27/2023    Kim CHRISTIANSON M.D. personally performed the services described in this documentation, as scribed by  Alison Donald in my presence, and it is both accurate and complete.